# Patient Record
Sex: MALE | Race: WHITE | Employment: FULL TIME | URBAN - METROPOLITAN AREA
[De-identification: names, ages, dates, MRNs, and addresses within clinical notes are randomized per-mention and may not be internally consistent; named-entity substitution may affect disease eponyms.]

---

## 2019-04-10 RX ORDER — SERTRALINE HYDROCHLORIDE 50 MG/1
25 TABLET, FILM COATED ORAL DAILY
Qty: 90 TAB | Refills: 11 | OUTPATIENT
Start: 2019-04-10

## 2019-07-15 ENCOUNTER — OFFICE VISIT (OUTPATIENT)
Dept: FAMILY MEDICINE CLINIC | Age: 34
End: 2019-07-15

## 2019-07-15 VITALS
TEMPERATURE: 98.4 F | HEART RATE: 68 BPM | OXYGEN SATURATION: 99 % | SYSTOLIC BLOOD PRESSURE: 120 MMHG | HEIGHT: 73 IN | BODY MASS INDEX: 27.04 KG/M2 | WEIGHT: 204 LBS | DIASTOLIC BLOOD PRESSURE: 79 MMHG | RESPIRATION RATE: 16 BRPM

## 2019-07-15 DIAGNOSIS — F41.1 GAD (GENERALIZED ANXIETY DISORDER): Primary | ICD-10-CM

## 2019-07-15 DIAGNOSIS — Z11.3 SCREENING FOR STDS (SEXUALLY TRANSMITTED DISEASES): ICD-10-CM

## 2019-07-15 DIAGNOSIS — Z82.3 FAMILY HISTORY OF STROKE: ICD-10-CM

## 2019-07-15 RX ORDER — SERTRALINE HYDROCHLORIDE 25 MG/1
25 TABLET, FILM COATED ORAL DAILY
Qty: 90 TAB | Refills: 1 | Status: SHIPPED | OUTPATIENT
Start: 2019-07-15 | End: 2020-01-21

## 2019-07-15 RX ORDER — SERTRALINE HYDROCHLORIDE 25 MG/1
25 TABLET, FILM COATED ORAL DAILY
COMMUNITY
End: 2019-07-15 | Stop reason: SDUPTHER

## 2019-07-15 NOTE — PROGRESS NOTES
Selena Tineo  26 y.o. male  1985  FF    [facility]  Progress Note     Encounter Date: 7/15/2019    Assessment and Plan:     Encounter Diagnoses     ICD-10-CM ICD-9-CM   1. ELE (generalized anxiety disorder) F41.1 300.02   2. Family history of stroke Z82.3 V17.1   3. Screening for STDs (sexually transmitted diseases) Z11.3 V74.5       1. ELE (generalized anxiety disorder)  Well controlled. Continue medication. - sertraline (ZOLOFT) 25 mg tablet; Take 1 Tab by mouth daily. Dispense: 90 Tab; Refill: 1    2. Family history of stroke  - LIPID PANEL  - METABOLIC PANEL, BASIC    3. Screening for STDs (sexually transmitted diseases)  - CT/NG/T.VAGINALIS AMPLIFICATION; Future  - HEPATITIS B CORE AB W/REFLEX  - HIV 1/2 AG/AB, 4TH GENERATION,W RFLX CONFIRM  - T PALLIDUM SCREEN W/REFLEX      I have discussed the diagnosis with the patient and the intended plan as seen in the above orders. he has expressed understanding. The patient has received an after-visit summary and questions were answered concerning future plans. I have discussed medication side effects and warnings with the patient as well. Electronically Signed: Megan Pierre MD    Current Medications after this visit     Current Outpatient Medications   Medication Sig    sertraline (ZOLOFT) 25 mg tablet Take 1 Tab by mouth daily. No current facility-administered medications for this visit. Medications Discontinued During This Encounter   Medication Reason    sertraline (ZOLOFT) 25 mg tablet Reorder     ~~~~~~~~~~~~~~~~~~~~~~~~~~~~~~~~~~~~~~~~~~~~~~    Chief Complaint   Patient presents with    Anxiety    Medication Refill     zoloft       History provided by patient  History of Present Illness   Selena Tineo is a 26 y.o. male who presents to clinic today for:    Anxiety Review:  Patient is seen for anxiety disorder. Current treatment includes Zoloft and no other therapies.    Prior treatments: n/a  Ongoig symptoms include:  racing thoughts, feelings of losing control. Patient denies: appetite loss, decreased sleep, depressed mood, inappropriate guilt, psychomotor agitation and psychomotor retardation chest pain, suicidal ideation, homocidal ideation. Reported side effects from the treatment: none      Health Maintenance  Health Maintenance Due   Topic Date Due    DTaP/Tdap/Td series (1 - Tdap) 11/22/2004     Review of Systems   Review of Systems   Constitutional: Negative for chills and fever. Cardiovascular: Negative for chest pain, palpitations and leg swelling. Gastrointestinal: Negative for abdominal pain, constipation, diarrhea, nausea and vomiting. Genitourinary: Negative for dysuria and urgency. Skin: Negative for itching and rash. Neurological: Negative for dizziness, tingling and headaches. Psychiatric/Behavioral: Negative for depression, hallucinations and substance abuse. The patient is not nervous/anxious and does not have insomnia. Vitals/Objective:     Vitals:    07/15/19 1128   BP: 120/79   Pulse: 68   Resp: 16   Temp: 98.4 °F (36.9 °C)   TempSrc: Oral   SpO2: 99%   Weight: 204 lb (92.5 kg)   Height: 6' 1\" (1.854 m)     Body mass index is 26.91 kg/m². Wt Readings from Last 3 Encounters:   07/15/19 204 lb (92.5 kg)       Physical Exam   Constitutional: He is oriented to person, place, and time. He appears well-developed and well-nourished. HENT:   Head: Normocephalic and atraumatic. Right Ear: External ear normal.   Left Ear: External ear normal.   Cardiovascular: Normal rate and regular rhythm. Pulmonary/Chest: Effort normal and breath sounds normal.   Musculoskeletal: He exhibits no edema. Neurological: He is alert and oriented to person, place, and time. No results found for this or any previous visit (from the past 24 hour(s)).    Disposition     Follow-up and Dispositions  ·   Return in about 6 months (around 1/15/2020) for Routine (Chronic Conditions). No future appointments. History   Patient's past medical, surgical and family histories were reviewed and updated. Past Medical History:   Diagnosis Date    Generalized anxiety disorder     Major depression, single episode     Panic attacks      Past Surgical History:   Procedure Laterality Date    HX WISDOM TEETH EXTRACTION       Family History   Problem Relation Age of Onset    Stroke Maternal Grandmother     Cancer Maternal Grandfather         renal carcinoma    Breast Cancer Paternal Grandmother     Prostate Cancer Paternal Grandfather     No Known Problems Mother     Arthritis Father     Arthritis-osteo Father      Social History     Tobacco Use    Smoking status: Never Smoker    Smokeless tobacco: Never Used   Substance Use Topics    Alcohol use:  Yes    Drug use: Not Currently       Allergies   No Known Allergies

## 2019-07-15 NOTE — PATIENT INSTRUCTIONS

## 2019-07-15 NOTE — PROGRESS NOTES
Identified pt with two pt identifiers(name and ). Reviewed record in preparation for visit and have obtained necessary documentation. Chief Complaint   Patient presents with    Anxiety    Medication Refill     zoloft        Health Maintenance Due   Topic    DTaP/Tdap/Td series (1 - Tdap)       Coordination of Care Questionnaire:  :   1) Have you been to an emergency room, urgent care, or hospitalized since your last visit? If yes, where when, and reason for visit? no       2. Have seen or consulted any other health care provider since your last visit? If yes, where when, and reason for visit? NO  e    Patient is accompanied by self I have received verbal consent from Dalton Stuart to discuss any/all medical information while they are present in the room.

## 2019-07-18 LAB
C TRACH RRNA SPEC QL NAA+PROBE: NEGATIVE
N GONORRHOEA RRNA SPEC QL NAA+PROBE: NEGATIVE
T VAGINALIS RRNA VAG QL NAA+PROBE: NEGATIVE

## 2020-03-12 ENCOUNTER — TELEPHONE (OUTPATIENT)
Dept: FAMILY MEDICINE CLINIC | Age: 35
End: 2020-03-12

## 2020-03-12 DIAGNOSIS — Z82.3 FAMILY HISTORY OF STROKE: ICD-10-CM

## 2020-03-12 DIAGNOSIS — Z11.3 SCREENING FOR STD (SEXUALLY TRANSMITTED DISEASE): ICD-10-CM

## 2020-03-12 DIAGNOSIS — Z13.220 SCREENING FOR LIPID DISORDERS: Primary | ICD-10-CM

## 2020-03-12 NOTE — TELEPHONE ENCOUNTER
Pt requesting labs before appt 20. Labs in system from last July, unsure if they have  since he did not get them at time of appt.

## 2020-03-24 DIAGNOSIS — F41.1 GAD (GENERALIZED ANXIETY DISORDER): ICD-10-CM

## 2020-03-24 NOTE — TELEPHONE ENCOUNTER
----- Message from Raizat Mercury sent at 3/24/2020  1:52 PM EDT -----  Regarding: Dr. Kasia Frazier first and last name: Connie Padilla  Reason for call:   PT would like his medication \"certaline\" 25 mg sent to the [facility] on file. Pt had an appt for 4/1/2020 rescheduled to May 1st and the appt was for a med refill. Pt wanted to reschedule it due to the [place] pandemic that's going on and concerns about health. Pt wanted to see if his meds can be called in prior to the May appt since he doesn't want to come in to the office on April 1st. Pt wants April blood work to be rescheduled for May 1st on the same day.    Callback required yes/no and why: yes  Best contact number(s):  [phone]  Details to clarify the request:

## 2020-03-26 RX ORDER — SERTRALINE HYDROCHLORIDE 25 MG/1
TABLET, FILM COATED ORAL
Qty: 30 TAB | Refills: 0 | OUTPATIENT
Start: 2020-03-26

## 2020-08-13 ENCOUNTER — VIRTUAL VISIT (OUTPATIENT)
Dept: FAMILY MEDICINE CLINIC | Age: 35
End: 2020-08-13
Payer: COMMERCIAL

## 2020-08-13 DIAGNOSIS — F32.1 CURRENT MODERATE EPISODE OF MAJOR DEPRESSIVE DISORDER, UNSPECIFIED WHETHER RECURRENT (HCC): Primary | ICD-10-CM

## 2020-08-13 DIAGNOSIS — F41.1 GAD (GENERALIZED ANXIETY DISORDER): ICD-10-CM

## 2020-08-13 PROCEDURE — 99214 OFFICE O/P EST MOD 30 MIN: CPT | Performed by: FAMILY MEDICINE

## 2020-08-13 RX ORDER — SERTRALINE HYDROCHLORIDE 50 MG/1
TABLET, FILM COATED ORAL
Qty: 26 TAB | Refills: 0 | Status: SHIPPED | OUTPATIENT
Start: 2020-08-13 | End: 2020-09-12

## 2020-08-13 RX ORDER — SERTRALINE HYDROCHLORIDE 50 MG/1
TABLET, FILM COATED ORAL
Qty: 26 TAB | Refills: 0 | Status: SHIPPED | OUTPATIENT
Start: 2020-08-13 | End: 2020-08-13

## 2020-08-13 NOTE — PROGRESS NOTES
Identified pt with two pt identifiers(name and ). Reviewed record in preparation for visit and have obtained necessary documentation. Chief Complaint   Patient presents with    Medication Refill        Health Maintenance Due   Topic    DTaP/Tdap/Td series (1 - Tdap)    Influenza Age 5 to Adult        There were no vitals taken for this visit. Coordination of Care Questionnaire:  :   1) Have you been to an emergency room, urgent care, or hospitalized since your last visit? NO    2. Have seen or consulted any other health care provider since your last visit? NO          Patient is accompanied by  I have received verbal consent from Pat Jensen to discuss any/all medical information while they are present in the room.

## 2020-08-13 NOTE — PROGRESS NOTES
Yoly Treviño  37 y.o. male  1985  KUW:[id number]    [facility] [address] [facility]  Progress Note     Encounter Date: 8/13/2020    Yoly Treviño is a 37 y.o. male who was seen by synchronous (real-time) audio-video technology on 8/13/2020. He and/or him healthcare decision maker is aware that this patient-initiated Telehealth encounter is a billable service, with coverage as determined by her insurance carrier. He  is aware that he may receive a bill and has provided verbal consent to proceed:Yes    I was at home while conducting this encounter. The patient was checked in/\"roomed\" by Mariela Schwarz CMA via telephone in the office. The patient was at home during the encounter. This visit was completed virtually using Doxy. me  Assessment and Plan:     Encounter Diagnoses     ICD-10-CM ICD-9-CM   1. Current moderate episode of major depressive disorder, unspecified whether recurrent (HCC)  F32.1 296.22   2. ELE (generalized anxiety disorder)  F41.1 300.02       1. Current moderate episode of major depressive disorder, unspecified whether recurrent (HCC)2. ELE (generalized anxiety disorder)  Patient's anxiety symptoms are currently well controlled. Resume sertaline and increase dose due to worsening depression. Patient to continue exercise. Close follow up in 3-4 weeks. - sertraline (ZOLOFT) 50 mg tablet; Take 0.5 Tabs by mouth daily for 8 days, THEN 1 Tab daily for 22 days. Dispense: 26 Tab; Refill: 0      We discussed the expected course, resolution and complications of the diagnosis(es) in detail. Medication risks, benefits, costs, interactions, and alternatives were discussed as indicated. I advised him to contact the office if his condition worsens, changes or fails to improve as anticipated. He expressed understanding with the diagnosis(es) and plan.      CPT Codes 55603-89061 for Established Patients may apply to this Telehealth Visit    Pursuant to the emergency declaration under the [facility], [facility] waiver authority and the [employer] Act, this Virtual  Visit was conducted, with patient's consent, to reduce the patient's risk of exposure to COVID-19 and provide continuity of care for an established patient. Services were provided through a video synchronous discussion virtually to substitute for in-person clinic visit. Electronically Signed: Mervin Muhammad MD    Current Medications after this visit     Current Outpatient Medications   Medication Sig    sertraline (ZOLOFT) 50 mg tablet Take 0.5 Tabs by mouth daily for 8 days, THEN 1 Tab daily for 22 days. No current facility-administered medications for this visit. Medications Discontinued During This Encounter   Medication Reason    sertraline (ZOLOFT) 25 mg tablet Reorder    sertraline (ZOLOFT) 50 mg tablet      ~~~~~~~~~~~~~~~~~~~~~~~~~~~~~~~~~~~~~~~~~~~~~~    Chief Complaint   Patient presents with    Medication Refill       History of Present Illness   Rafy Watts is a 37 y.o. male who presents for:    Anxiety Review:  Patient is seen for anxiety disorder. He has been without medication for 1 week. Ongoig symptoms include: anhedonia, depressed mood and hopelessness . Patient denies:  paresthesias, insomnia, racing thoughts, feelings of losing control, difficulty concentrating, suicidal ideation, homocidal ideation. Current treatment includes Zoloft and no other therapies. Reported side effects from the treatment: n/a  No flowsheet data found. Review of Systems   Review of Systems   Constitutional: Negative for chills and fever. HENT: Negative for congestion, ear discharge and sore throat. Eyes: Negative for double vision, photophobia and discharge. Respiratory: Negative for cough, sputum production, shortness of breath and wheezing. Cardiovascular: Negative for chest pain, palpitations and leg swelling. Gastrointestinal: Negative for diarrhea, nausea and vomiting. Genitourinary: Negative for dysuria and urgency. Skin: Negative. Neurological: Negative for dizziness, tremors and headaches. Psychiatric/Behavioral: Positive for depression. Negative for hallucinations. The patient is not nervous/anxious and does not have insomnia. Vitals/Objective:     Due to this being a TeleHealth evaluation, many elements of the physical examination are unable to be assessed. Physical Exam  Constitutional:       General: He is not in acute distress. Appearance: Normal appearance. He is not ill-appearing, toxic-appearing or diaphoretic. HENT:      Head: Normocephalic and atraumatic. Right Ear: External ear normal.      Left Ear: External ear normal.   Eyes:      General:         Right eye: No discharge. Left eye: No discharge. Conjunctiva/sclera: Conjunctivae normal.   Pulmonary:      Effort: Pulmonary effort is normal.   Skin:     Coloration: Skin is not pale. Findings: No erythema. Neurological:      General: No focal deficit present. Mental Status: He is alert. Cranial Nerves: No cranial nerve deficit (  No Facial Asymmetry (Cranial nerve 7 motor function) (limited exam due to video visit)  ). Comments: Able to follow commands   Psychiatric:         Mood and Affect: Mood normal.         Behavior: Behavior normal.         Thought Content: Thought content normal.         No results found for this or any previous visit (from the past 24 hour(s)). Disposition     Follow-up and Dispositions  ·   Return in about 4 weeks (around 9/10/2020) for Mood. .       No future appointments. History   Patient's past medical, surgical and family histories were reviewed and updated.     Past Medical History:   Diagnosis Date    Generalized anxiety disorder     Major depression, single episode     Panic attacks      Past Surgical History:   Procedure Laterality Date    HX WISDOM TEETH EXTRACTION       Family History   Problem Relation Age of Onset    Stroke Maternal Grandmother     Cancer Maternal Grandfather         renal carcinoma    Breast Cancer Paternal Grandmother     Prostate Cancer Paternal Grandfather     No Known Problems Mother     Arthritis Father     Arthritis-osteo Father      Social History     Tobacco Use    Smoking status: Never Smoker    Smokeless tobacco: Never Used   Substance Use Topics    Alcohol use:  Yes    Drug use: Not Currently       Allergies   No Known Allergies

## 2020-09-14 ENCOUNTER — VIRTUAL VISIT (OUTPATIENT)
Dept: FAMILY MEDICINE CLINIC | Age: 35
End: 2020-09-14
Payer: COMMERCIAL

## 2020-09-14 DIAGNOSIS — F41.1 GAD (GENERALIZED ANXIETY DISORDER): ICD-10-CM

## 2020-09-14 DIAGNOSIS — F33.1 MODERATE EPISODE OF RECURRENT MAJOR DEPRESSIVE DISORDER (HCC): Primary | ICD-10-CM

## 2020-09-14 PROCEDURE — 99213 OFFICE O/P EST LOW 20 MIN: CPT | Performed by: FAMILY MEDICINE

## 2020-09-14 RX ORDER — SERTRALINE HYDROCHLORIDE 50 MG/1
50 TABLET, FILM COATED ORAL DAILY
Qty: 90 TAB | Refills: 0 | Status: SHIPPED | OUTPATIENT
Start: 2020-09-14

## 2020-09-14 NOTE — PROGRESS NOTES
Hailee Thomas  37 y.o. male  1985  PIS:[id number]    [facility]  Progress Note     Encounter Date: 9/14/2020    Haliee Thomas is a 37 y.o. male who was seen by synchronous (real-time) audio-video technology on 9/14/2020. He and/or him healthcare decision maker is aware that this patient-initiated Telehealth encounter is a billable service, with coverage as determined by her insurance carrier. He  is aware that he may receive a bill and has provided verbal consent to proceed:Yes    I was at home while conducting this encounter. The patient was checked in/\"roomed\" by Venus Sterling CMA via telephone in the office. The patient was at home during the encounter. This visit was completed virtually using Doxy. me  Assessment and Plan:     Encounter Diagnoses     ICD-10-CM ICD-9-CM   1. Moderate episode of recurrent major depressive disorder ([facility])  F33.1 296.32   2. ELE (generalized anxiety disorder)  F41.1 300.02       1. Moderate episode of recurrent major depressive disorder ([address]  2. ELE (generalized anxiety disorder)  Patient's mood improved; he is happy at current dose. Continue medication for 3 motnhs. Check in at that time. - sertraline (ZOLOFT) 50 mg tablet; Take 1 Tab by mouth daily. Dispense: 90 Tab; Refill: 0      We discussed the expected course, resolution and complications of the diagnosis(es) in detail. Medication risks, benefits, costs, interactions, and alternatives were discussed as indicated. I advised him to contact the office if his condition worsens, changes or fails to improve as anticipated. He expressed understanding with the diagnosis(es) and plan.      CPT Codes 10855-96779 for Established Patients may apply to this Telehealth Visit    Pursuant to the emergency declaration under the [address] [place] [address] authority and the [employer] Act, this Virtual Visit was conducted, with patient's consent, to reduce the patient's risk of exposure to COVID-19 and provide continuity of care for an established patient. Services were provided through a video synchronous discussion virtually to substitute for in-person clinic visit. Electronically Signed: Chirag Nolen MD    Current Medications after this visit     Current Outpatient Medications   Medication Sig    sertraline (ZOLOFT) 50 mg tablet Take 1 Tab by mouth daily. No current facility-administered medications for this visit. There are no discontinued medications. ~~~~~~~~~~~~~~~~~~~~~~~~~~~~~~~~~~~~~~~~~~~~~~    Chief Complaint   Patient presents with    Medication Refill     Sertraline        History of Present Illness   Carole Rubio is a 37 y.o. male who presents for:    Depression Review:  Patient is seen for depression. Patient reports he is feeling well. Ongoig symptoms include: none. Patient denies: anhedonia, depressed mood, hopelessness and inappropriate guilt feelings of losing control, suicidal ideation, homocidal ideation. Current treatment includes Zoloft and no other therapies. Reported side effects from the treatment: fatgiue  3 most recent PHQ Screens 7/15/2019   Little interest or pleasure in doing things Not at all   Feeling down, depressed, irritable, or hopeless Not at all   Total Score PHQ 2 0     No flowsheet data found. Review of Systems   Review of Systems   Constitutional:        See HPI for pertinent review of systems        Vitals/Objective:     Due to this being a TeleHealth evaluation, many elements of the physical examination are unable to be assessed. Physical Exam  Constitutional:       General: He is not in acute distress. Appearance: Normal appearance. He is normal weight. He is not ill-appearing, toxic-appearing or diaphoretic. HENT:      Head: Normocephalic and atraumatic.       Right Ear: External ear normal.      Left Ear: External ear normal.   Eyes:      General:         Right eye: No discharge. Left eye: No discharge. Conjunctiva/sclera: Conjunctivae normal.   Pulmonary:      Effort: Pulmonary effort is normal.   Skin:     Coloration: Skin is not pale. Findings: No erythema. Neurological:      General: No focal deficit present. Mental Status: He is alert. Cranial Nerves: No cranial nerve deficit (  No Facial Asymmetry (Cranial nerve 7 motor function) (limited exam due to video visit)  ). Comments: Able to follow commands   Psychiatric:         Mood and Affect: Mood normal.         Behavior: Behavior normal.         Thought Content: Thought content normal.         No results found for this or any previous visit (from the past 24 hour(s)). Disposition     Follow-up and Dispositions  ·   Return in about 3 months (around 12/14/2020). No future appointments. History   Patient's past medical, surgical and family histories were reviewed and updated. Past Medical History:   Diagnosis Date    Generalized anxiety disorder     Major depression, single episode     Panic attacks      Past Surgical History:   Procedure Laterality Date    HX WISDOM TEETH EXTRACTION       Family History   Problem Relation Age of Onset    Stroke Maternal Grandmother     Cancer Maternal Grandfather         renal carcinoma    Breast Cancer Paternal Grandmother     Prostate Cancer Paternal Grandfather     No Known Problems Mother     Arthritis Father     Arthritis-osteo Father      Social History     Tobacco Use    Smoking status: Never Smoker    Smokeless tobacco: Never Used   Substance Use Topics    Alcohol use:  Yes    Drug use: Not Currently       Allergies   No Known Allergies

## 2020-09-14 NOTE — PROGRESS NOTES
Identified pt with two pt identifiers(name and ). Reviewed record in preparation for visit and have obtained necessary documentation. Chief Complaint   Patient presents with    Medication Refill     Sertraline         Health Maintenance Due   Topic    DTaP/Tdap/Td series (1 - Tdap)    Flu Vaccine (1)       Coordination of Care Questionnaire:  :   1) Have you been to an emergency room, urgent care, or hospitalized since your last visit? If yes, where when, and reason for visit? no      2. Have seen or consulted any other health care provider since your last visit? If yes, where when, and reason for visit?  no        Patient is accompanied by self I have received verbal consent from Bowen Sanders to discuss any/all medical information while they are present in the room.

## 2024-09-26 SDOH — HEALTH STABILITY: PHYSICAL HEALTH: ON AVERAGE, HOW MANY DAYS PER WEEK DO YOU ENGAGE IN MODERATE TO STRENUOUS EXERCISE (LIKE A BRISK WALK)?: 4 DAYS

## 2024-09-26 SDOH — HEALTH STABILITY: PHYSICAL HEALTH: ON AVERAGE, HOW MANY MINUTES DO YOU ENGAGE IN EXERCISE AT THIS LEVEL?: 60 MIN

## 2024-09-27 ENCOUNTER — OFFICE VISIT (OUTPATIENT)
Age: 39
End: 2024-09-27

## 2024-09-27 VITALS
RESPIRATION RATE: 16 BRPM | BODY MASS INDEX: 27.96 KG/M2 | DIASTOLIC BLOOD PRESSURE: 65 MMHG | WEIGHT: 211 LBS | HEART RATE: 70 BPM | HEIGHT: 73 IN | TEMPERATURE: 98.6 F | OXYGEN SATURATION: 97 % | SYSTOLIC BLOOD PRESSURE: 101 MMHG

## 2024-09-27 DIAGNOSIS — J30.2 SEASONAL ALLERGIES: ICD-10-CM

## 2024-09-27 DIAGNOSIS — E66.3 OVERWEIGHT (BMI 25.0-29.9): ICD-10-CM

## 2024-09-27 DIAGNOSIS — Z80.42 FAMILY HISTORY OF PROSTATE CANCER: ICD-10-CM

## 2024-09-27 DIAGNOSIS — Z00.00 ANNUAL PHYSICAL EXAM: Primary | ICD-10-CM

## 2024-09-27 DIAGNOSIS — Z76.89 ENCOUNTER TO ESTABLISH CARE WITH NEW DOCTOR: ICD-10-CM

## 2024-09-27 DIAGNOSIS — Z13.220 SCREENING FOR LIPID DISORDERS: ICD-10-CM

## 2024-09-27 DIAGNOSIS — Z11.4 SCREENING FOR HIV WITHOUT PRESENCE OF RISK FACTORS: ICD-10-CM

## 2024-09-27 DIAGNOSIS — Z12.5 SCREENING FOR MALIGNANT NEOPLASM OF PROSTATE: ICD-10-CM

## 2024-09-27 DIAGNOSIS — Z13.1 SCREENING FOR DIABETES MELLITUS: ICD-10-CM

## 2024-09-27 DIAGNOSIS — Z11.59 ENCOUNTER FOR HEPATITIS C SCREENING TEST FOR LOW RISK PATIENT: ICD-10-CM

## 2024-09-27 PROCEDURE — 99386 PREV VISIT NEW AGE 40-64: CPT

## 2024-09-27 SDOH — ECONOMIC STABILITY: FOOD INSECURITY: WITHIN THE PAST 12 MONTHS, THE FOOD YOU BOUGHT JUST DIDN'T LAST AND YOU DIDN'T HAVE MONEY TO GET MORE.: NEVER TRUE

## 2024-09-27 SDOH — ECONOMIC STABILITY: FOOD INSECURITY: WITHIN THE PAST 12 MONTHS, YOU WORRIED THAT YOUR FOOD WOULD RUN OUT BEFORE YOU GOT MONEY TO BUY MORE.: NEVER TRUE

## 2024-09-27 SDOH — ECONOMIC STABILITY: INCOME INSECURITY: HOW HARD IS IT FOR YOU TO PAY FOR THE VERY BASICS LIKE FOOD, HOUSING, MEDICAL CARE, AND HEATING?: NOT VERY HARD

## 2024-09-27 ASSESSMENT — PATIENT HEALTH QUESTIONNAIRE - PHQ9
5. POOR APPETITE OR OVEREATING: NOT AT ALL
6. FEELING BAD ABOUT YOURSELF - OR THAT YOU ARE A FAILURE OR HAVE LET YOURSELF OR YOUR FAMILY DOWN: NOT AT ALL
SUM OF ALL RESPONSES TO PHQ QUESTIONS 1-9: 3
SUM OF ALL RESPONSES TO PHQ QUESTIONS 1-9: 3
10. IF YOU CHECKED OFF ANY PROBLEMS, HOW DIFFICULT HAVE THESE PROBLEMS MADE IT FOR YOU TO DO YOUR WORK, TAKE CARE OF THINGS AT HOME, OR GET ALONG WITH OTHER PEOPLE: NOT DIFFICULT AT ALL
8. MOVING OR SPEAKING SO SLOWLY THAT OTHER PEOPLE COULD HAVE NOTICED. OR THE OPPOSITE, BEING SO FIGETY OR RESTLESS THAT YOU HAVE BEEN MOVING AROUND A LOT MORE THAN USUAL: NOT AT ALL
1. LITTLE INTEREST OR PLEASURE IN DOING THINGS: SEVERAL DAYS
7. TROUBLE CONCENTRATING ON THINGS, SUCH AS READING THE NEWSPAPER OR WATCHING TELEVISION: SEVERAL DAYS
3. TROUBLE FALLING OR STAYING ASLEEP: NOT AT ALL
SUM OF ALL RESPONSES TO PHQ QUESTIONS 1-9: 3
SUM OF ALL RESPONSES TO PHQ9 QUESTIONS 1 & 2: 2
9. THOUGHTS THAT YOU WOULD BE BETTER OFF DEAD, OR OF HURTING YOURSELF: NOT AT ALL
SUM OF ALL RESPONSES TO PHQ QUESTIONS 1-9: 3
4. FEELING TIRED OR HAVING LITTLE ENERGY: NOT AT ALL
2. FEELING DOWN, DEPRESSED OR HOPELESS: SEVERAL DAYS

## 2024-09-29 LAB
ALBUMIN SERPL-MCNC: 4.3 G/DL (ref 3.5–5)
ALBUMIN/GLOB SERPL: 1.5 (ref 1.1–2.2)
ALP SERPL-CCNC: 73 U/L (ref 45–117)
ALT SERPL-CCNC: 38 U/L (ref 12–78)
ANION GAP SERPL CALC-SCNC: 0 MMOL/L (ref 2–12)
AST SERPL-CCNC: 18 U/L (ref 15–37)
BILIRUB SERPL-MCNC: 0.8 MG/DL (ref 0.2–1)
BUN SERPL-MCNC: 15 MG/DL (ref 6–20)
BUN/CREAT SERPL: 14 (ref 12–20)
CALCIUM SERPL-MCNC: 9.8 MG/DL (ref 8.5–10.1)
CHLORIDE SERPL-SCNC: 108 MMOL/L (ref 97–108)
CHOLEST SERPL-MCNC: 169 MG/DL
CO2 SERPL-SCNC: 32 MMOL/L (ref 21–32)
CREAT SERPL-MCNC: 1.11 MG/DL (ref 0.7–1.3)
ERYTHROCYTE [DISTWIDTH] IN BLOOD BY AUTOMATED COUNT: 13.2 % (ref 11.5–14.5)
EST. AVERAGE GLUCOSE BLD GHB EST-MCNC: 85 MG/DL
GLOBULIN SER CALC-MCNC: 2.8 G/DL (ref 2–4)
GLUCOSE SERPL-MCNC: 89 MG/DL (ref 65–100)
HBA1C MFR BLD: 4.6 % (ref 4–5.6)
HCT VFR BLD AUTO: 45.9 % (ref 36.6–50.3)
HCV AB SER IA-ACNC: <0.02 INDEX
HCV AB SERPL QL IA: NONREACTIVE
HDLC SERPL-MCNC: 45 MG/DL
HDLC SERPL: 3.8 (ref 0–5)
HGB BLD-MCNC: 15.4 G/DL (ref 12.1–17)
HIV 1+2 AB+HIV1 P24 AG SERPL QL IA: NONREACTIVE
HIV 1/2 RESULT COMMENT: NORMAL
LDLC SERPL CALC-MCNC: 100.2 MG/DL (ref 0–100)
MCH RBC QN AUTO: 28.2 PG (ref 26–34)
MCHC RBC AUTO-ENTMCNC: 33.6 G/DL (ref 30–36.5)
MCV RBC AUTO: 84.1 FL (ref 80–99)
NRBC # BLD: 0 K/UL (ref 0–0.01)
NRBC BLD-RTO: 0 PER 100 WBC
PLATELET # BLD AUTO: 207 K/UL (ref 150–400)
PMV BLD AUTO: 11.8 FL (ref 8.9–12.9)
POTASSIUM SERPL-SCNC: 4.8 MMOL/L (ref 3.5–5.1)
PROT SERPL-MCNC: 7.1 G/DL (ref 6.4–8.2)
PSA SERPL-MCNC: 1.5 NG/ML (ref 0.01–4)
RBC # BLD AUTO: 5.46 M/UL (ref 4.1–5.7)
SODIUM SERPL-SCNC: 140 MMOL/L (ref 136–145)
TRIGL SERPL-MCNC: 119 MG/DL
VLDLC SERPL CALC-MCNC: 23.8 MG/DL
WBC # BLD AUTO: 6.1 K/UL (ref 4.1–11.1)

## 2024-12-23 ENCOUNTER — OFFICE VISIT (OUTPATIENT)
Age: 39
End: 2024-12-23

## 2024-12-23 VITALS
WEIGHT: 210.4 LBS | BODY MASS INDEX: 27.89 KG/M2 | HEIGHT: 73 IN | TEMPERATURE: 98.2 F | DIASTOLIC BLOOD PRESSURE: 75 MMHG | OXYGEN SATURATION: 98 % | SYSTOLIC BLOOD PRESSURE: 133 MMHG | HEART RATE: 78 BPM

## 2024-12-23 DIAGNOSIS — R30.0 DYSURIA: Primary | ICD-10-CM

## 2024-12-23 LAB
BILIRUBIN, URINE, POC: NEGATIVE
BLOOD URINE, POC: NEGATIVE
GLUCOSE URINE, POC: NEGATIVE
KETONES, URINE, POC: NEGATIVE
LEUKOCYTE ESTERASE, URINE, POC: NEGATIVE
NITRITE, URINE, POC: NEGATIVE
PH, URINE, POC: 7 (ref 4.6–8)
PROTEIN,URINE, POC: NORMAL
SPECIFIC GRAVITY, URINE, POC: 1.02 (ref 1–1.03)
URINALYSIS CLARITY, POC: CLEAR
URINALYSIS COLOR, POC: YELLOW
UROBILINOGEN, POC: NORMAL

## 2024-12-23 PROCEDURE — 81003 URINALYSIS AUTO W/O SCOPE: CPT

## 2024-12-23 PROCEDURE — 99213 OFFICE O/P EST LOW 20 MIN: CPT

## 2024-12-23 NOTE — PATIENT INSTRUCTIONS
- I will be in touch regarding your lab results  - if nothing shows up on the lab results and symptoms are persisting in x2 weeks, I recommend following back up in clinic

## 2024-12-23 NOTE — PROGRESS NOTES
Jaime Parra is a 39 y.o. male      Chief Complaint   Patient presents with    Urinary Frequency     Urgency, \"tingle sensation\" 2.5 weeks.  STI testing.       \"Have you been to the ER, urgent care clinic since your last visit?  Hospitalized since your last visit?\"    NO    “Have you seen or consulted any other health care providers outside of [facility] since your last visit?”    NO            Click Here for Release of Records Request    Vitals:    12/23/24 1603   BP: 133/75   Site: Right Upper Arm   Position: Sitting   Cuff Size: Large Adult   Pulse: 78   Temp: 98.2 °F (36.8 °C)   TempSrc: Oral   SpO2: 98%   Weight: 95.4 kg (210 lb 6.4 oz)   Height: 1.854 m (6' 0.99\")           Medication Reconciliation Completed, changes notes. Please Update medication list.  
with urinary urgency/frequency. POC UA normal without evidence of infection. Prior lab work without evidence of diabetes. Low suspicion for prostatitis given absence of pain.  - AMB POC URINALYSIS DIP STICK AUTO W/O MICRO  - Chlamydia, Gonorrhea, Trichomoniasis; Future  - Culture, Urine; Future  - Culture, Urine  - Chlamydia, Gonorrhea, Trichomoniasis      Return if symptoms worsen or fail to improve.      I have discussed the diagnosis with the patient and the intended plan as seen in the above orders. Patient verbalized understanding of the plan and agrees with the plan. The patient has received an after-visit summary and questions were answered concerning future plans.  I have discussed medication side effects and warnings with the patient as well. Informed patient to return to the office if new symptoms arise.        Gabriel Nunez MD  Patient discussed with attending Dr. Mak

## 2024-12-25 LAB
BACTERIA SPEC CULT: NORMAL
SERVICE CMNT-IMP: NORMAL

## 2024-12-26 LAB
C TRACH RRNA SPEC QL NAA+PROBE: POSITIVE
N GONORRHOEA RRNA SPEC QL NAA+PROBE: NEGATIVE
SPECIMEN SOURCE: ABNORMAL
T VAGINALIS RRNA SPEC QL NAA+PROBE: NEGATIVE

## 2024-12-27 ENCOUNTER — TELEPHONE (OUTPATIENT)
Age: 39
End: 2024-12-27

## 2024-12-27 DIAGNOSIS — A74.9 CHLAMYDIA INFECTION: Primary | ICD-10-CM

## 2024-12-27 RX ORDER — DOXYCYCLINE HYCLATE 100 MG
100 TABLET ORAL 2 TIMES DAILY
Qty: 14 TABLET | Refills: 0 | Status: SHIPPED | OUTPATIENT
Start: 2024-12-27 | End: 2025-01-03

## 2024-12-27 NOTE — TELEPHONE ENCOUNTER
Doxycycline prescribed for +Chlamydia testing. All questions answered.    Remainder of labs normal.    Gabriel Nunez MD